# Patient Record
Sex: FEMALE | Race: WHITE | NOT HISPANIC OR LATINO | Employment: FULL TIME | URBAN - METROPOLITAN AREA
[De-identification: names, ages, dates, MRNs, and addresses within clinical notes are randomized per-mention and may not be internally consistent; named-entity substitution may affect disease eponyms.]

---

## 2025-05-28 ENCOUNTER — APPOINTMENT (OUTPATIENT)
Dept: RADIOLOGY | Facility: HOSPITAL | Age: 35
End: 2025-05-28
Payer: COMMERCIAL

## 2025-05-28 ENCOUNTER — HOSPITAL ENCOUNTER (EMERGENCY)
Facility: HOSPITAL | Age: 35
Discharge: HOME | End: 2025-05-28
Payer: COMMERCIAL

## 2025-05-28 VITALS
DIASTOLIC BLOOD PRESSURE: 100 MMHG | HEART RATE: 84 BPM | TEMPERATURE: 97.5 F | RESPIRATION RATE: 20 BRPM | WEIGHT: 293 LBS | SYSTOLIC BLOOD PRESSURE: 191 MMHG | HEIGHT: 67 IN | OXYGEN SATURATION: 100 % | BODY MASS INDEX: 45.99 KG/M2

## 2025-05-28 DIAGNOSIS — M76.61 TENDONITIS, ACHILLES, RIGHT: Primary | ICD-10-CM

## 2025-05-28 PROCEDURE — 99283 EMERGENCY DEPT VISIT LOW MDM: CPT

## 2025-05-28 PROCEDURE — 73610 X-RAY EXAM OF ANKLE: CPT | Mod: LEFT SIDE | Performed by: RADIOLOGY

## 2025-05-28 PROCEDURE — 73610 X-RAY EXAM OF ANKLE: CPT | Mod: LT

## 2025-05-28 ASSESSMENT — PAIN DESCRIPTION - LOCATION: LOCATION: ANKLE

## 2025-05-28 ASSESSMENT — LIFESTYLE VARIABLES
HAVE YOU EVER FELT YOU SHOULD CUT DOWN ON YOUR DRINKING: NO
HAVE PEOPLE ANNOYED YOU BY CRITICIZING YOUR DRINKING: NO
EVER HAD A DRINK FIRST THING IN THE MORNING TO STEADY YOUR NERVES TO GET RID OF A HANGOVER: NO
TOTAL SCORE: 0
EVER FELT BAD OR GUILTY ABOUT YOUR DRINKING: NO

## 2025-05-28 ASSESSMENT — PAIN SCALES - GENERAL: PAINLEVEL_OUTOF10: 10 - WORST POSSIBLE PAIN

## 2025-05-28 ASSESSMENT — PAIN - FUNCTIONAL ASSESSMENT: PAIN_FUNCTIONAL_ASSESSMENT: 0-10

## 2025-05-28 NOTE — ED PROVIDER NOTES
HPI   Chief Complaint   Patient presents with    Ankle Pain       34-year-old female presenting to the emergency department from home with her father due to a left ankle injury.  Patient states yesterday she started experiencing pain in the left heel but this morning when she was taking a step down a stair she felt sharp pain in the same area.  Denies pain at rest but pain is exacerbated with weightbearing and ambulation.  No reported numbness or tingling.  No prior injuries to the left ankle or foot. She does state she has a history of plantar fasciitis but this does not feel similar.              Patient History   Medical History[1]  Surgical History[2]  Family History[3]  Social History[4]    Physical Exam   ED Triage Vitals [05/28/25 1000]   Temperature Heart Rate Respirations BP   36.4 °C (97.5 °F) 84 20 (!) 191/100      Pulse Ox Temp Source Heart Rate Source Patient Position   100 % Temporal -- Sitting      BP Location FiO2 (%)     Right arm --       Physical Exam  Vitals and nursing note reviewed.   Constitutional:       General: She is not in acute distress.     Appearance: She is obese. She is not ill-appearing or toxic-appearing.   HENT:      Head: Atraumatic.      Nose: Nose normal.      Mouth/Throat:      Mouth: Mucous membranes are moist.   Eyes:      Extraocular Movements: Extraocular movements intact.      Conjunctiva/sclera: Conjunctivae normal.   Cardiovascular:      Rate and Rhythm: Normal rate and regular rhythm.   Pulmonary:      Effort: Pulmonary effort is normal.      Breath sounds: Normal breath sounds.   Musculoskeletal:         General: Normal range of motion.      Cervical back: Neck supple.      Comments: No erythema, ecchymosis, or tenderness to palpation of the left ankle or foot.  Mild swelling of the heel present. Plantarflexion and dorsiflexion intact.  Compartments are soft.  Sensation intact, pedal pulses 2+.   Skin:     General: Skin is warm and dry.      Capillary Refill:  Capillary refill takes less than 2 seconds.   Neurological:      Mental Status: She is alert and oriented to person, place, and time.   Psychiatric:         Mood and Affect: Mood normal.           ED Course & MDM   Diagnoses as of 05/28/25 1156   Tendonitis, Achilles, right                 No data recorded     Harvey Coma Scale Score: 15 (05/28/25 1004 : Sara Amaya RN)                           Medical Decision Making  34-year-old female presenting to the emergency department from home with her father due to a left ankle injury.  Vital signs reviewed and stable.  Blood pressure elevated at 191/100 on arrival, patient remains asymptomatic.  Patient is overall well-appearing and not in any acute distress.  She is not ill or toxic appearing.  She was able to ambulate in the emergency department without difficulty.  Denies needing any medications for pain at this time. No erythema, ecchymosis, or tenderness to palpation of the right ankle or foot on exam.  There is mild swelling of the heel. Plantarflexion and dorsiflexion intact.  Compartments are soft.  RLE well-perfused and neurovascularly intact.  XR without evidence of acute fracture or malalignment.  Soft tissue swelling was noted with tendinopathy of the Achilles tendon with insertional enthesophyte. Achilles does not appear to be ruptured on physical exam.  Patient and her father were informed of imaging findings, all questions and concerns were answered.  She denies needing crutches for assistance with ambulation, an Ace wrap was applied prior to discharge.  Recommended RICE therapy and NSAIDs as needed for pain.  Advised to call to schedule a follow-up appointment with an orthopedic specialist today regarding today's emergency department visit.  Strict return precautions were given to return to the emergency department with any new or worsening symptoms.  Patient was agreeable to plan of care and discharged in stable  condition.        Procedure  Procedures       [1] No past medical history on file.  [2] No past surgical history on file.  [3] No family history on file.  [4]   Social History  Tobacco Use    Smoking status: Not on file    Smokeless tobacco: Not on file   Substance Use Topics    Alcohol use: Not on file    Drug use: Not on file        Consuelo Burt PA-C  05/28/25 3945

## 2025-05-28 NOTE — DISCHARGE INSTRUCTIONS
Recommend rest, elevation, and icing twice daily for 15 minutes.  An Ace wrap was applied prior to discharge to provide compression. You should use NSAIDs such as ibuprofen as needed at home for pain. The contact information for an orthopedic specialist was provided, Dr. Molina, advised to call today to schedule a follow-up appointment.  Return to the emergency department with any new or worsening symptoms including pain out of proportion, severe swelling, discoloration, numbness or tingling in the foot, subsequent falls or injuries.